# Patient Record
Sex: FEMALE | Employment: FULL TIME | URBAN - METROPOLITAN AREA
[De-identification: names, ages, dates, MRNs, and addresses within clinical notes are randomized per-mention and may not be internally consistent; named-entity substitution may affect disease eponyms.]

---

## 2018-01-22 PROCEDURE — 86780 TREPONEMA PALLIDUM: CPT | Performed by: OBSTETRICS & GYNECOLOGY

## 2018-01-22 PROCEDURE — 87340 HEPATITIS B SURFACE AG IA: CPT | Performed by: OBSTETRICS & GYNECOLOGY

## 2018-01-22 PROCEDURE — 86696 HERPES SIMPLEX TYPE 2 TEST: CPT | Performed by: OBSTETRICS & GYNECOLOGY

## 2018-01-22 PROCEDURE — 87389 HIV-1 AG W/HIV-1&-2 AB AG IA: CPT | Performed by: OBSTETRICS & GYNECOLOGY

## 2018-01-22 PROCEDURE — 88175 CYTOPATH C/V AUTO FLUID REDO: CPT | Performed by: OBSTETRICS & GYNECOLOGY

## 2018-01-22 PROCEDURE — 86803 HEPATITIS C AB TEST: CPT | Performed by: OBSTETRICS & GYNECOLOGY

## 2018-01-22 PROCEDURE — 36415 COLL VENOUS BLD VENIPUNCTURE: CPT | Performed by: OBSTETRICS & GYNECOLOGY

## 2018-01-22 PROCEDURE — 87591 N.GONORRHOEAE DNA AMP PROB: CPT | Performed by: OBSTETRICS & GYNECOLOGY

## 2018-01-22 PROCEDURE — 86695 HERPES SIMPLEX TYPE 1 TEST: CPT | Performed by: OBSTETRICS & GYNECOLOGY

## 2018-01-22 PROCEDURE — 87491 CHLMYD TRACH DNA AMP PROBE: CPT | Performed by: OBSTETRICS & GYNECOLOGY

## 2021-08-06 PROBLEM — J01.00 ACUTE NON-RECURRENT MAXILLARY SINUSITIS: Status: RESOLVED | Noted: 2021-08-06 | Resolved: 2021-08-06

## 2021-08-06 PROBLEM — J01.00 ACUTE NON-RECURRENT MAXILLARY SINUSITIS: Status: ACTIVE | Noted: 2021-08-06

## 2023-09-25 ENCOUNTER — OFFICE VISIT (OUTPATIENT)
Dept: FAMILY MEDICINE CLINIC | Facility: CLINIC | Age: 28
End: 2023-09-25
Payer: COMMERCIAL

## 2023-09-25 VITALS
RESPIRATION RATE: 18 BRPM | DIASTOLIC BLOOD PRESSURE: 60 MMHG | OXYGEN SATURATION: 97 % | HEIGHT: 64 IN | SYSTOLIC BLOOD PRESSURE: 110 MMHG | BODY MASS INDEX: 29.16 KG/M2 | TEMPERATURE: 98 F | WEIGHT: 170.81 LBS | HEART RATE: 75 BPM

## 2023-09-25 DIAGNOSIS — Z00.00 ADULT GENERAL MEDICAL EXAMINATION: Primary | ICD-10-CM

## 2023-09-25 DIAGNOSIS — F41.1 GENERALIZED ANXIETY DISORDER: ICD-10-CM

## 2023-09-25 DIAGNOSIS — K21.9 GASTROESOPHAGEAL REFLUX DISEASE, UNSPECIFIED WHETHER ESOPHAGITIS PRESENT: ICD-10-CM

## 2023-09-25 DIAGNOSIS — F90.9 ATTENTION DEFICIT HYPERACTIVITY DISORDER (ADHD), UNSPECIFIED ADHD TYPE: ICD-10-CM

## 2023-09-25 RX ORDER — LISDEXAMFETAMINE DIMESYLATE 10 MG/1
CAPSULE ORAL
COMMUNITY

## 2023-09-25 RX ORDER — SERTRALINE HYDROCHLORIDE 25 MG/1
TABLET, FILM COATED ORAL
COMMUNITY
Start: 2023-03-30

## 2023-09-25 RX ORDER — EPINEPHRINE 0.3 MG/.3ML
INJECTION SUBCUTANEOUS
COMMUNITY
Start: 2022-12-16

## 2023-09-25 RX ORDER — PANTOPRAZOLE SODIUM 40 MG/1
40 TABLET, DELAYED RELEASE ORAL
Qty: 90 TABLET | Refills: 0 | Status: SHIPPED | OUTPATIENT
Start: 2023-09-25

## 2023-10-03 ENCOUNTER — TELEPHONE (OUTPATIENT)
Facility: CLINIC | Age: 28
End: 2023-10-03

## 2023-10-03 ENCOUNTER — OFFICE VISIT (OUTPATIENT)
Facility: CLINIC | Age: 28
End: 2023-10-03

## 2023-10-03 VITALS
HEIGHT: 64 IN | SYSTOLIC BLOOD PRESSURE: 122 MMHG | DIASTOLIC BLOOD PRESSURE: 79 MMHG | HEART RATE: 69 BPM | BODY MASS INDEX: 28.51 KG/M2 | WEIGHT: 167 LBS

## 2023-10-03 DIAGNOSIS — R13.12 OROPHARYNGEAL DYSPHAGIA: ICD-10-CM

## 2023-10-03 DIAGNOSIS — R49.9 HOARSENESS OR CHANGING VOICE: ICD-10-CM

## 2023-10-03 DIAGNOSIS — K21.9 GASTROESOPHAGEAL REFLUX DISEASE, UNSPECIFIED WHETHER ESOPHAGITIS PRESENT: Primary | ICD-10-CM

## 2023-10-03 DIAGNOSIS — R09.89 THROAT CLEARING: ICD-10-CM

## 2023-10-03 DIAGNOSIS — R13.10 DYSPHAGIA, UNSPECIFIED TYPE: ICD-10-CM

## 2023-10-03 PROCEDURE — 3078F DIAST BP <80 MM HG: CPT

## 2023-10-03 PROCEDURE — 99244 OFF/OP CNSLTJ NEW/EST MOD 40: CPT

## 2023-10-03 PROCEDURE — 3074F SYST BP LT 130 MM HG: CPT

## 2023-10-03 PROCEDURE — 3008F BODY MASS INDEX DOCD: CPT

## 2023-10-03 NOTE — H&P
[address] [facility] - Gastroenterology                                                                                                               Reason for consult: GERD    Requesting physician or provider: Nelda Clifton MD    Patient presents with:  Gastro-esophageal Reflux  Heartburn      HPI:   Jacques Kirk is a 33year old year-old female with medical history including ADHD, OCD, seasonal allergies, GERD. Surgical hx of tonsillectomy. she is here today for evaluation of GERD  -reports since 2017 feeling heartburn. pt was prescribed pantoprazole by PCP and told to follow up with GI. Pt moved to [place] for a few years for work and was scheduled for EGD for May of 2020 which was then canceled because of covid  -pt then moved back to [facility] and never had EGD done  -pt has taken omeprazole over the counter but still gets breakthrough symptoms  -her current symptoms  ---having heartburn daily if not taking omeprazole, triggers include coffee, alcohol  ---occasionally associated with nausea, frequent throat clearing and epigastric pain  ---about 1 month ago patient reports when drinking water was having oral-pharyngeal dysphagia and coughing when drinking water. Associated with voice change during this time which lasted a couple weeks  ----pt saw PCP and started on pantoprazole on 9/25/23  ---she does take daily zyrtec & nasocort spray for seasonal allergies    Patient denies symptoms of  vomiting, odynophagia, globus sensation, hematemesis, abdominal pain, change in bowel habits, constipation, diarrhea, hematochezia, or melena. she denies recent change in appetite, fever or unintentional weight loss.       Last colonoscopy: none   Last EGD:none     NSAIDS: occasional    Tobacco:none   Alcohol: 10 drinks week  Marijuana: rare  Illicit drugs: none     FH GI malignancy: none     No history of adverse reaction to sedation  No TRE  No anticoagulants  No pacemaker/defibrillator  No pain medications and/or sleep aides    Wt Readings from Last 6 Encounters:  10/03/23 : 167 lb (75.8 kg)  09/25/23 : 170 lb 12.8 oz (77.5 kg)  08/06/21 : 154 lb (69.9 kg)  08/06/21 : 154 lb (69.9 kg)  01/22/18 : 139 lb 11.2 oz (63.4 kg)  08/20/15 : 145 lb 12.8 oz (66.1 kg)       History, Medications, Allergies, ROS:      Past Medical History:   Diagnosis Date    Acid reflux 2017    Acne     ADHD     Esophageal reflux     OCD (obsessive compulsive disorder)     Seasonal allergies       Past Surgical History:   Procedure Laterality Date    TONSILLECTOMY  12/2017      Family Hx:   Family History   Problem Relation Age of Onset    Thyroid disease Mother     Hypertension Mother     Heart Disease Maternal Grandfather       Social History:   Social History     Socioeconomic History    Marital status: Single   Tobacco Use    Smoking status: Never    Smokeless tobacco: Never   Vaping Use    Vaping Use: Never used   Substance and Sexual Activity    Alcohol use: Yes     Alcohol/week: 0.0 standard drinks of alcohol     Comment: 2x weekly    Drug use: No    Sexual activity: Yes     Partners: Male     Birth control/protection: Condom, OCP     Comment: 1/22/18        Medications (Active prior to today's visit):  Current Outpatient Medications   Medication Sig Dispense Refill    EPINEPHrine 0.3 MG/0.3ML Injection Solution Auto-injector       sertraline 25 MG Oral Tab TAKE 1 TABLET BY MOUTH EVERY DAY FOR 1-2 WEEKS, THEN 2 TABLETS BY MOUTH DAILY      sertraline 50 MG Oral Tab Take 1.5 tablets (75 mg total) by mouth daily. Lisdexamfetamine Dimesylate (VYVANSE) 10 MG Oral Cap Take by mouth.      pantoprazole 40 MG Oral Tab EC Take 1 tablet (40 mg total) by mouth every morning before breakfast. 90 tablet 0       Allergies:    Shellfish               HIVES    Comment:Pt has since seen allergist and denies this             allergy now.     ROS:   CONSTITUTIONAL: negative for fevers, chills, sweats and weight loss  EYES Negative for scleral icterus or redness, and diplopia  HEENT: Negative for dysphagia and hoarseness  RESPIRATORY: Negative for cough and severe shortness of breath  CARDIOVASCULAR: Negative for crushing sub-sternal chest pain  GASTROINTESTINAL: See HPI  GENITOURINARY: Negative for dysuria  MUSCULOSKELETAL: Negative for arthralgias and myalgias  SKIN: Negative for jaundice, rash or pruritus  NEUROLOGICAL: Negative for dizziness and headaches  BEHAVIOR/PSYCH: Negative for psychotic behavior and poor appetite    PHYSICAL EXAM:   Blood pressure 122/79, pulse 69, height 5' 4\" (1.626 m), weight 167 lb (75.8 kg), last menstrual period 09/11/2023, not currently breastfeeding. GEN: Alert, no acute distress, well-nourished   HEENT: anicteric sclera, neck supple, trachea midline, MMM,   CV: RRR, the extremities are warm and well perfused   LUNGS: No increased work of breathing  ABDOMEN: Soft, symmetrical, non-tender without distention or guarding. No masses, hepatomegaly or splenomegaly noted. MSK: No erythema, no warmth, no swelling of joints  SKIN: No jaundice, no erythema, no rashes, no lesions  HEMATOLOGIC: No bleeding, no bruising  NEURO: Alert and interactive, [place]  PSYCH: appropriate mood & affect    Labs/Imaging/Procedures:     Patient's pertinent labs and imaging were reviewed and discussed with patient today. .  ASSESSMENT/PLAN:   Ursula Gómez is a 33year old year-old female with medical history including ADHD, OCD, seasonal allergies, GERD. Surgical hx of tonsillectomy. she is here today for evaluation of GERD  -reports since 2017 feeling heartburn. pt was prescribed pantoprazole by PCP and told to follow up with GI.  Pt moved to [place] for a few years for work and was scheduled for EGD for May of 2020 which was then canceled because of covid  -pt then moved back to [facility] and never had EGD done  -pt has taken omeprazole over the counter but still gets breakthrough symptoms  -her current symptoms  ---having heartburn daily if not taking omeprazole, triggers include coffee, alcohol  ---occasionally associated with nausea, frequent throat clearing and epigastric pain  ---about 1 month ago patient reports when drinking water was having oral-pharyngeal dysphagia and coughing when drinking water. Associated with voice change during this time which lasted a couple weeks  ----pt saw PCP and started on pantoprazole on 9/25/23  ---she does take daily zyrtec & nasocort spray for seasonal allergies  -Differentials include but not limited to: GERD, dietary pattern induced (large meals, spicy, acidic, fatty, greasy foods, caffeine/alcohol intake), infectious esophagitis, EOE, reflux hypersensitivity or functional heartburn, ess likely esophageal motility disorder,     Recommendations:  -will defer H. Pylori for now given PPI use, will test for this during EGD    -call to schedule xray video swallow    -can take famotidine 20-40mg nightly or as needed nightly    -continue to take pantoprazole     -decrease alcohol intake     -follow up appt with me in January 1. Schedule upper endoscopy (EGD) with Dr. Latricia Lipscomb, Dr. Radha Anderson or Dr. Lakshmi Pabon: chronic GERD w/ voice change & dysphagia]        Orders This Visit:  No orders of the defined types were placed in this encounter. Meds This Visit:  Requested Prescriptions      No prescriptions requested or ordered in this encounter       Imaging & Referrals:  XR VIDEO SWALLOW ([id number]=[id number])      Otis Manjarrez [facility]  10/2/2023        This note was partially prepared using [facility] voice recognition dictation software. As a result, errors may occur. When identified, these errors have been corrected.  While every attempt is made to correct errors during dictation, discrepancies may still exist.

## 2023-10-03 NOTE — TELEPHONE ENCOUNTER
Scheduled for:  [phone]  Provider Name:    Date:  10/10/23  Location:  [facility]  Sedation:  MAC  Time:  9:30 am, (pt is aware to arrive at 8:30 am)  Prep:  NPO after midnight  Meds/Allergies Reconciled?: Rosa Maria/BESSY reviewed. Diagnosis with codes:  GERD K21.9, Dysphagia R13.10  Was patient informed to call insurance with codes (Y/N): Yes    Referral sent?:  Referral was sent at the time of electronic surgical scheduling. [facility] or [address]  notified?: I sent an electronic request to Endo Scheduling and received a confirmation today. Medication Orders:  Hold Vyvanse 7 days prior to procedure. Misc Orders:  N/A     Further instructions given by staff: I discussed the prep instructions with the patient which she verbally understood. Provided patient with prep instructions at the time of office visit.

## 2023-10-03 NOTE — PATIENT INSTRUCTIONS
-will defer H. Pylori for now given PPI use, will test for this during EGD    -call to schedule xray video swallow    -can take famotidine 20-40mg nightly or as needed nightly    -continue to take pantoprazole     -decrease alcohol intake     -follow up appt with me in January 1. Schedule upper endoscopy (EGD) with Dr. Denver Harley, Dr. Laura Garcia or Dr. Rosales Proud: chronic GERD w/ voice change & dysphagia]    2. If you start any NEW medication after your visit today, please notify us. Certain medications will need to be held before the procedure, or the procedure cannot be performed safely. 3. DO NOT TAKE: Iron (ferrous sulfate/ ferrous gluconate) pills, herbal supplements, multivitamins, or diet medications (i.e. Phentermine/Vyvanse) for 7 days before exam.  DO NOT TAKE: Any form of alcohol, recreational drugs and any forms of Erectile Dysfunction medications 24 hours prior to procedure. 4. The day BEFORE your procedure, NOTHING TO EAT OR DRINK AFTER MIDNIGHT! If your procedure is scheduled in the afternoon, you may have clear liquids only (see below) up to 3 hours before the time of your procedure. If you fail to keep your stomach empty for 3 hours prior to procedure time, your procedure may be CANCELLED.  Instructions can also be found at: [email or web address]

## 2023-10-04 RX ORDER — CETIRIZINE HYDROCHLORIDE 10 MG/1
10 TABLET ORAL DAILY
COMMUNITY

## 2023-10-10 ENCOUNTER — ANESTHESIA EVENT (OUTPATIENT)
Dept: ENDOSCOPY | Facility: HOSPITAL | Age: 28
End: 2023-10-10
Payer: COMMERCIAL

## 2023-10-10 ENCOUNTER — ANESTHESIA (OUTPATIENT)
Dept: ENDOSCOPY | Facility: HOSPITAL | Age: 28
End: 2023-10-10
Payer: COMMERCIAL

## 2023-10-10 ENCOUNTER — HOSPITAL ENCOUNTER (OUTPATIENT)
Facility: HOSPITAL | Age: 28
Setting detail: HOSPITAL OUTPATIENT SURGERY
Discharge: HOME | End: 2023-10-10
Attending: INTERNAL MEDICINE | Admitting: INTERNAL MEDICINE
Payer: COMMERCIAL

## 2023-10-10 VITALS
SYSTOLIC BLOOD PRESSURE: 99 MMHG | HEIGHT: 64 IN | RESPIRATION RATE: 25 BRPM | OXYGEN SATURATION: 100 % | WEIGHT: 167 LBS | BODY MASS INDEX: 28.51 KG/M2 | DIASTOLIC BLOOD PRESSURE: 72 MMHG | HEART RATE: 66 BPM

## 2023-10-10 DIAGNOSIS — R13.10 DYSPHAGIA, UNSPECIFIED TYPE: ICD-10-CM

## 2023-10-10 DIAGNOSIS — K21.9 GASTROESOPHAGEAL REFLUX DISEASE, UNSPECIFIED WHETHER ESOPHAGITIS PRESENT: ICD-10-CM

## 2023-10-10 LAB — B-HCG UR QL: NEGATIVE

## 2023-10-10 PROCEDURE — 43239 EGD BIOPSY SINGLE/MULTIPLE: CPT | Performed by: INTERNAL MEDICINE

## 2023-10-10 PROCEDURE — 0DB18ZX EXCISION OF UPPER ESOPHAGUS, VIA NATURAL OR ARTIFICIAL OPENING ENDOSCOPIC, DIAGNOSTIC: ICD-10-PCS | Performed by: INTERNAL MEDICINE

## 2023-10-10 PROCEDURE — 0DB38ZX EXCISION OF LOWER ESOPHAGUS, VIA NATURAL OR ARTIFICIAL OPENING ENDOSCOPIC, DIAGNOSTIC: ICD-10-PCS | Performed by: INTERNAL MEDICINE

## 2023-10-10 PROCEDURE — 0DB78ZX EXCISION OF STOMACH, PYLORUS, VIA NATURAL OR ARTIFICIAL OPENING ENDOSCOPIC, DIAGNOSTIC: ICD-10-PCS | Performed by: INTERNAL MEDICINE

## 2023-10-10 RX ORDER — LIDOCAINE HYDROCHLORIDE 10 MG/ML
INJECTION, SOLUTION EPIDURAL; INFILTRATION; INTRACAUDAL; PERINEURAL AS NEEDED
Status: DISCONTINUED | OUTPATIENT
Start: 2023-10-10 | End: 2023-10-10 | Stop reason: SURG

## 2023-10-10 RX ORDER — SODIUM CHLORIDE, SODIUM LACTATE, POTASSIUM CHLORIDE, CALCIUM CHLORIDE 600; 310; 30; 20 MG/100ML; MG/100ML; MG/100ML; MG/100ML
INJECTION, SOLUTION INTRAVENOUS CONTINUOUS
Status: DISCONTINUED | OUTPATIENT
Start: 2023-10-10 | End: 2023-10-10

## 2023-10-10 RX ADMIN — SODIUM CHLORIDE, SODIUM LACTATE, POTASSIUM CHLORIDE, CALCIUM CHLORIDE: 600; 310; 30; 20 INJECTION, SOLUTION INTRAVENOUS at 09:36:00

## 2023-10-10 RX ADMIN — SODIUM CHLORIDE, SODIUM LACTATE, POTASSIUM CHLORIDE, CALCIUM CHLORIDE: 600; 310; 30; 20 INJECTION, SOLUTION INTRAVENOUS at 09:53:00

## 2023-10-10 RX ADMIN — LIDOCAINE HYDROCHLORIDE 20 MG: 10 INJECTION, SOLUTION EPIDURAL; INFILTRATION; INTRACAUDAL; PERINEURAL at 09:42:00

## 2023-10-10 NOTE — DISCHARGE INSTRUCTIONS
Home Care Instructions for  Gastroscopy with Sedation    Diet:  - Resume your regular diet as tolerated unless otherwise instructed. - Start with light meals to minimize bloating.  - Do not drink alcohol today. Medication:  - If you have questions about resuming your normal medications, please contact your Primary Care Physician. Activities:  - Take it easy today. Do not return to work today. - Do not drive today. - Do not operate any machinery today (including kitchen equipment). Gastroscopy:  - You may have a sore throat for 2-3 days following the exam. This is normal. Gargling with warm salt water (1/2 tsp salt to 1 glass warm water) or using throat lozenges will help. - If you experience any sharp pain in your neck, abdomen or chest, vomiting of blood, oral temperature over 100 degrees Fahrenheit, light-headedness or dizziness, or any other problems, contact your doctor. **If unable to reach your doctor, please go to the [facility] Emergency Room**    - Your referring physician will receive a full report of your examination.  - If you do not hear from your doctor's office within two weeks of your biopsy, please call them for your results. You may be able to see your laboratory results in [facility] between 4 and 7 business days. In some cases, your physician may not have viewed the results before they are released to [address] If you have questions regarding your results contact the physician who ordered the test/exam by phone or via [address] by choosing \"Ask a Medical Question. \"

## 2023-10-10 NOTE — ANESTHESIA POSTPROCEDURE EVALUATION
Patient: Esteban Schaumann [place]    Procedure Summary       Date: 10/10/23 Room / Location: [address] [place] [address] ENDOSCOPY 04 / [facility] ENDOSCOPY    Anesthesia Start: 9950 Anesthesia Stop:     Procedure: ESOPHAGOGASTRODUODENOSCOPY Diagnosis:       Gastroesophageal reflux disease, unspecified whether esophagitis present      Dysphagia, unspecified type      (gastric polyps, hiatal hernia)    Surgeons: Michael Jo MD Anesthesiologist: Peña Gao CRNA    Anesthesia Type: MAC ASA Status: 2            Anesthesia Type: MAC    Vitals Value Taken Time   BP 97/65 10/10/23 0954   Temp 97 10/10/23 0954   Pulse 71 10/10/23 0954   Resp 16 10/10/23 0954   SpO2 96 10/10/23 0954       [facility] AN Post Evaluation:   Patient Evaluated in PACU  Patient Participation: waiting for patient participation  Level of Consciousness: sleepy but conscious  Pain Score: 0  Pain Management: adequate  Airway Patency:patent  Dental exam unchanged from preop  Yes    Cardiovascular Status: acceptable  Respiratory Status: acceptable  Postoperative Hydration acceptable      Pamella Barry CRNA  10/10/2023 9:54 AM

## 2023-10-10 NOTE — OPERATIVE REPORT
[address] Endoscopy Report      Date of Procedure:  10/10/23        Preoperative Diagnosis:  Gastroesophageal reflux      Postoperative Diagnosis:  1. Small hiatal hernia  2. Diminutive gastric polyps      Procedure:    Esophagogastroduodenoscopy with biopsy      Surgeon:  Gilda Wilson M.D. Anesthesia:  Monitored anesthesia care  EBL:  Insignificant      Brief History: This is a 33year old female who presents for an upper endoscopy to evaluate a several year history of gastroesophageal reflux (heartburn) previously incompletely responsive to acid suppression. Symptoms are now better controlled on 40 mg of pantoprazole daily. The patient had 1 episode of oropharyngeal dysphagia to liquids but has had no solid food dysphagia. She has occasional upper abdominal discomfort. She is currently asymptomatic on pantoprazole. Technique:  After informed consent, the patient was placed in the left lateral recumbent position. An Olympus adult HD gastroscope was inserted into the hypopharynx and advanced under direct vision into the esophagus, stomach and duodenum. The endoscope was withdrawn and a retroflexed view of the gastric angulus, body, cardia and fundus was performed. The instrument was straightened insufflated air and fluid were suctioned and the endoscope was withdrawn. The procedure was well tolerated without immediate complication. Findings: The esophagus was normal without evidence of ulceration, erosion, stricture or ring. There were no features of eosinophilic esophagitis. Biopsies were obtained from the proximal and distal esophagus and placed in separate containers. The squamocolumnar junction was slightly irregular. Biopsies of the junction were included in the aforementioned distal esophageal biopsies. The GE junction and diaphragmatic impression were at 35/36 cm with a 1 cm sliding hiatal hernia.   The stomach distended appropriately with insufflated air.  The mucosa of the stomach including cardia, fundus, gastric body and antrum was normal with the exception of several 3-4 mm pale opaque cloudlike polyps in the proximal gastric body and fundus consistent with fundic gland polyps. A few were biopsied. Biopsies were taken from the antrum and placed in a separate container. The duodenal bulb and post bulbar region were normal.      Impression:  1. Small hiatal hernia  2. Diminutive gastric polyps likely fundic gland in nature    Recommendations:  1. Follow-up biopsy results. 2.  Continue dietary and lifestyle modification for reflux along with acid suppression as needed. 3.  Further recommendations pending biopsy and clinical course.       Elaine Tenorio MD  10/10/2023

## 2023-10-10 NOTE — H&P
History & Physical Examination    Patient Name: Harry Ferrer  MRN: [id number]  CSN: [id number]  YOB: 1995    Diagnosis: Gastroesophageal reflux      cetirizine 10 MG Oral Tab, Take 1 tablet (10 mg total) by mouth daily. , Disp: , Rfl:   EPINEPHrine 0.3 MG/0.3ML Injection Solution Auto-injector, , Disp: , Rfl:   sertraline 25 MG Oral Tab, TAKE 1 TABLET BY MOUTH EVERY DAY FOR 1-2 WEEKS, THEN 2 TABLETS BY MOUTH DAILY, Disp: , Rfl:   sertraline 50 MG Oral Tab, Take 1.5 tablets (75 mg total) by mouth daily. , Disp: , Rfl:   Lisdexamfetamine Dimesylate (VYVANSE) 10 MG Oral Cap, Take by mouth., Disp: , Rfl: , 9/29/2023  pantoprazole 40 MG Oral Tab EC, Take 1 tablet (40 mg total) by mouth every morning before breakfast., Disp: 90 tablet, Rfl: 0      lactated ringers infusion, , Intravenous, Continuous        Allergies:   Shellfish               HIVES    Comment:Pt has since seen allergist and denies this             allergy now. Past Medical History:   Diagnosis Date    Acid reflux 2017    Acne     ADHD     Attention deficit disorder     Depression     Esophageal reflux     OCD (obsessive compulsive disorder)     Seasonal allergies      Past Surgical History:   Procedure Laterality Date    TONSILLECTOMY  12/2017     Family History   Problem Relation Age of Onset    Thyroid disease Mother     Hypertension Mother     Heart Disease Maternal Grandfather      Social History    Tobacco Use      Smoking status: Never      Smokeless tobacco: Never    Alcohol use: Yes      Alcohol/week: 0.0 standard drinks of alcohol      Comment: 2x weekly      SYSTEM Check if Review is Normal Check if Physical Exam is Normal If not normal, please explain:   BRADEN Murphy ] [ Juan Jose Murphy ] [ X]    HEART Erich.Trey ] [ Gabriela Jung.Hoar ] [ Aurelio Jung.Hoar ] [ Krishna Jung.Trey ] [ Vitaliy Nix        [ x ] I have discussed the risks and benefits and alternatives with the patient/family.   They understand and agree to proceed with plan of care. [ x ] I have reviewed the History and Physical done within the last 30 days. Any changes noted above.     Yovany Jaime MD  10/10/2023  9:55 AM

## 2023-12-28 DIAGNOSIS — K21.9 GASTROESOPHAGEAL REFLUX DISEASE, UNSPECIFIED WHETHER ESOPHAGITIS PRESENT: ICD-10-CM

## 2023-12-28 RX ORDER — PANTOPRAZOLE SODIUM 40 MG/1
40 TABLET, DELAYED RELEASE ORAL
Qty: 90 TABLET | Refills: 0 | Status: SHIPPED | OUTPATIENT
Start: 2023-12-28

## 2024-03-19 DIAGNOSIS — K21.9 GASTROESOPHAGEAL REFLUX DISEASE, UNSPECIFIED WHETHER ESOPHAGITIS PRESENT: ICD-10-CM

## 2024-03-19 RX ORDER — PANTOPRAZOLE SODIUM 40 MG/1
40 TABLET, DELAYED RELEASE ORAL
Qty: 90 TABLET | Refills: 0 | Status: SHIPPED | OUTPATIENT
Start: 2024-03-19

## 2024-06-27 DIAGNOSIS — K21.9 GASTROESOPHAGEAL REFLUX DISEASE, UNSPECIFIED WHETHER ESOPHAGITIS PRESENT: ICD-10-CM

## 2024-06-27 RX ORDER — PANTOPRAZOLE SODIUM 40 MG/1
40 TABLET, DELAYED RELEASE ORAL
Qty: 90 TABLET | Refills: 0 | Status: SHIPPED | OUTPATIENT
Start: 2024-06-27

## 2024-06-27 NOTE — TELEPHONE ENCOUNTER
A refill request was received for:  Requested Prescriptions     Pending Prescriptions Disp Refills    PANTOPRAZOLE 40 MG Oral Tab EC [Pharmacy Med Name: PANTOPRAZOLE SOD DR 40 MG TAB] 90 tablet 0     Sig: TAKE 1 TABLET BY MOUTH BEFORE BREAKFAST     Last refill date:  03/19/2024  Qty: 90  Dx:   Gastroesophageal reflux disease, unspecified whether esophagitis        Last office visit: 09/25/2023   When is follow up due: 1 year       No future appointments.

## 2024-09-29 DIAGNOSIS — K21.9 GASTROESOPHAGEAL REFLUX DISEASE, UNSPECIFIED WHETHER ESOPHAGITIS PRESENT: ICD-10-CM

## 2024-09-30 NOTE — TELEPHONE ENCOUNTER
A refill request was received for:  Requested Prescriptions     Pending Prescriptions Disp Refills    PANTOPRAZOLE 40 MG Oral Tab EC [Pharmacy Med Name: PANTOPRAZOLE SOD DR 40 MG TAB] 90 tablet 0     Sig: TAKE 1 TABLET BY MOUTH BEFORE BREAKFAST     Last refill date:  6/27/24  Qty: 90 and 0   Dx: GERD  Last office visit: 9/25/23   When is follow up due: now     For hormone prescriptions, date of last blood test for rx being requested:    No future appointments.

## 2024-10-02 DIAGNOSIS — K21.9 GASTROESOPHAGEAL REFLUX DISEASE, UNSPECIFIED WHETHER ESOPHAGITIS PRESENT: ICD-10-CM

## 2024-10-02 RX ORDER — PANTOPRAZOLE SODIUM 40 MG/1
40 TABLET, DELAYED RELEASE ORAL
Qty: 90 TABLET | Refills: 0 | OUTPATIENT
Start: 2024-10-02

## 2024-10-03 RX ORDER — PANTOPRAZOLE SODIUM 40 MG/1
40 TABLET, DELAYED RELEASE ORAL
Qty: 90 TABLET | Refills: 0 | Status: SHIPPED | OUTPATIENT
Start: 2024-10-03 | End: 2024-10-07

## 2024-10-07 ENCOUNTER — TELEMEDICINE (OUTPATIENT)
Dept: FAMILY MEDICINE CLINIC | Facility: CLINIC | Age: 29
End: 2024-10-07
Payer: COMMERCIAL

## 2024-10-07 DIAGNOSIS — K21.9 GASTROESOPHAGEAL REFLUX DISEASE, UNSPECIFIED WHETHER ESOPHAGITIS PRESENT: ICD-10-CM

## 2024-10-07 RX ORDER — GARLIC EXTRACT 500 MG
1 CAPSULE ORAL DAILY
COMMUNITY

## 2024-10-07 RX ORDER — PANTOPRAZOLE SODIUM 40 MG/1
40 TABLET, DELAYED RELEASE ORAL
Qty: 90 TABLET | Refills: 2 | Status: SHIPPED | OUTPATIENT
Start: 2024-10-07

## 2024-10-07 RX ORDER — MULTIVITAMIN WITH IRON
250 TABLET ORAL
COMMUNITY

## 2024-10-07 RX ORDER — SERTRALINE HYDROCHLORIDE 100 MG/1
100 TABLET, FILM COATED ORAL DAILY
COMMUNITY
Start: 2024-09-27

## 2024-10-07 RX ORDER — LISDEXAMFETAMINE DIMESYLATE 20 MG/1
20 CAPSULE ORAL DAILY
COMMUNITY
Start: 2024-10-03

## 2024-10-07 NOTE — PROGRESS NOTES
Due to the real risk of possible exposure to Coronavirus (CoV-2, COVID-19) in the office/medical building and recommendations for social distancing (key to mitigation/limiting the spread of the virus) a Virtual or Telemedicine visit over the phone was performed as below.     Patient has consented to the Virtual/Telephone Check-In service and expresses understanding and accepts financial responsibility for any deductible, co-insurance and/or co-pays associated with this service.    [facility]  Telehealth Verbal Consent   I conducted a telehealth visit with Edie Jack today, 10/07/24, which was completed using two-way, real-time interactive audio and video communication. This has been done in good elisa to provide continuity of care in the best interest of the provider-patient relationship, due to the COVID -19 public health crisis/national emergency where restrictions of face-to-face office visits are ongoing. Every conscious effort was taken to allow for sufficient and adequate time to complete the visit.  The patient was made aware of the limitations of the telehealth visit, including treatment limitations as no physical exam could be performed.  The patient was advised to call 911 or to go to the ER in case there was an emergency.  The patient was also advised of the potential privacy & security concerns related to the telehealth platform.   The patient was made aware of where to find [facility]'s notice of privacy practices, telehealth consent form and other related consent forms and documents.  which are located on the [facility] website. The patient verbally agreed to telehealth consent form, related consents and the risks discussed.    Lastly, the patient confirmed that they were in [place].   Included in this visit, time may have been spent reviewing labs, medications, radiology tests and decision making. Appropriate medical decision-making and tests are ordered as detailed in the plan of  care above.  Coding/billing information is submitted for this visit based on complexity of care and/or time spent for the visit.    HPI:  Chief Complaint   Patient presents with    Follow - Up     Medication refills       Edie Jack is a 29 year old female who calls for complaints of:    Refill of pantoprazole. Hx GERD that is controlled with PPI, cannot tolerate being off this medicine. Had an EGD in 2023 which showed mild chronic gastritis. She has also been practicing lifestyle modifications. Feeling well overall.     ROS:  GEN: Denies fever, chills, fatigue.  CARDIOVASCULAR: Denies chest pain, dyspnea.  RESPIRATORY: Denies cough, dyspnea.  GI: Denies n/v/d/c, appetite normal.  NEURO: Denies headaches, dizziness.    Physical Exam:  GEN:  Patient is alert, awake and oriented, well developed, well nourished.  LUNGS: Patient speaks clearly in full sentences without dyspnea, no cough while on video visit.  PSYCH: Appropriate mood and affect.    Allergies   Allergen Reactions    Shellfish HIVES     Pt has since seen allergist and denies this allergy now.      Current Outpatient Medications   Medication Sig Dispense Refill    VYVANSE 20 MG Oral Cap Take 1 capsule (20 mg total) by mouth daily.      sertraline 100 MG Oral Tab Take 1 tablet (100 mg total) by mouth daily.      acidophilus-pectin Oral Cap Take 1 capsule by mouth daily.      B Complex-C-Folic Acid Oral Tab Take by mouth.      magnesium 250 MG Oral Tab Take 1 tablet (250 mg total) by mouth.      pantoprazole 40 MG Oral Tab EC Take 1 tablet (40 mg total) by mouth before breakfast. 90 tablet 2    cetirizine 10 MG Oral Tab Take 1 tablet (10 mg total) by mouth daily.      EPINEPHrine 0.3 MG/0.3ML Injection Solution Auto-injector        Past Medical History:    Acid reflux    Acne    ADHD    Attention deficit disorder    Depression    Esophageal reflux    OCD (obsessive compulsive disorder)    Seasonal allergies     Past Surgical History:   Procedure  Laterality Date    Tonsillectomy  12/2017         ASSESSMENT AND PLAN:   1. Patient is a 29 year old female who calls for: GERD follow-up    Additional Assessment and Plan:  1. Gastroesophageal reflux disease, unspecified whether esophagitis present  -Stable symptoms, CPM. Discussed risks vs benefits of long-term PPIs. Follow-up with GI as directed.   - pantoprazole 40 MG Oral Tab EC; Take 1 tablet (40 mg total) by mouth before breakfast.  Dispense: 90 tablet; Refill: 2        Follow up with me 3-6 months for physical    Call and/or go to the ER if worsening symptoms including, but not limited to: respiratory distress, shortness of breath and  wheezing, worsening fever, cough and mental status.      The patient (or patient's parent if <17 y/o) indicates understanding of the above recommendations and agrees to the above plan.    No orders of the defined types were placed in this encounter.      Meds & Refills for this Visit:  Requested Prescriptions     Signed Prescriptions Disp Refills    pantoprazole 40 MG Oral Tab EC 90 tablet 2     Sig: Take 1 tablet (40 mg total) by mouth before breakfast.       Imaging & Consults:  None    BESSY Waters     Time spent during encounter: 15 minutes

## 2025-02-27 ENCOUNTER — OFFICE VISIT (OUTPATIENT)
Facility: CLINIC | Age: 30
End: 2025-02-27

## 2025-02-27 VITALS
SYSTOLIC BLOOD PRESSURE: 124 MMHG | BODY MASS INDEX: 27.66 KG/M2 | HEART RATE: 80 BPM | HEIGHT: 64 IN | DIASTOLIC BLOOD PRESSURE: 78 MMHG | WEIGHT: 162 LBS

## 2025-02-27 DIAGNOSIS — K21.00 GASTROESOPHAGEAL REFLUX DISEASE WITH ESOPHAGITIS WITHOUT HEMORRHAGE: Primary | ICD-10-CM

## 2025-02-27 DIAGNOSIS — K44.9 HIATAL HERNIA: ICD-10-CM

## 2025-02-27 PROCEDURE — 3078F DIAST BP <80 MM HG: CPT

## 2025-02-27 PROCEDURE — 3074F SYST BP LT 130 MM HG: CPT

## 2025-02-27 PROCEDURE — 3008F BODY MASS INDEX DOCD: CPT

## 2025-02-27 PROCEDURE — 99214 OFFICE O/P EST MOD 30 MIN: CPT

## 2025-02-27 NOTE — PATIENT INSTRUCTIONS
-wean off pantoprazole    -instead take famotidine (pepcid) 20mg once to twice daily. Start by taking nightly before bed    -work with dietician to identify and avoid triggers. Virtual option \"[place]\"

## 2025-02-27 NOTE — PROGRESS NOTES
[facility] - Gastroenterology                                                                                                               Reason for consult: GERD    Requesting physician or provider: Roxana Billy MD    No chief complaint on file.      HPI:   Edie Jack is a 30 year old year-old female with medical history including ADHD, OCD, seasonal allergies, GERD. Surgical hx of tonsillectomy.     Today:  #GERD  -since prior visit she has been feeling well. She is getting  in July and would like to try to get pregnant later this year. She feels reliant on pantoprazole, heartburn and nausea return if she doesn't take pantoprazole even for 1 day. Her goal is to discontinue pantoprazole.  -she does avoid triggers such as BBQ sauce, alcohol, red wine, pizza, coffee  -EGD in October 2023, finding of small hiatal hernia, fundic gland gastric polyp, mild inflammation at GE junction.    Prior visit 10/3/2023:  she is here today for evaluation of GERD  -reports since 2017 feeling heartburn. pt was prescribed pantoprazole by PCP and told to follow up with GI. Pt moved to [place] for a few years for work and was scheduled for EGD for May of 2020 which was then canceled because of covid  -pt then moved back to [place] and never had EGD done  -pt has taken omeprazole over the counter but still gets breakthrough symptoms  -her current symptoms  ---having heartburn daily if not taking omeprazole, triggers include coffee, alcohol  ---occasionally associated with nausea, frequent throat clearing and epigastric pain  ---about 1 month ago patient reports when drinking water was having oral-pharyngeal dysphagia and coughing when drinking water. Associated with voice change during this time which lasted a couple weeks  ----pt saw PCP and started on pantoprazole on 9/25/23  ---she does take daily zyrtec & nasocort  spray for seasonal allergies    Patient denies symptoms of  vomiting, odynophagia, globus sensation, hematemesis, abdominal pain, change in bowel habits, constipation, diarrhea, hematochezia, or melena. she denies recent change in appetite, fever or unintentional weight loss.      Last colonoscopy: none   Last EGD: 10/10/2023  -small hiatal hernia  -gastric polyps     A. Gastric antrum; biopsy:  Gastric mucosa with mild chronic inactive gastritis.  No dysplasia or metaplasia is identified.  Diff-Quik stain (with appropriate control reactivity) is negative for H. pylori microorganisms.     B. Gastric polyp; biopsy:  Fundic gland polyp.  No dysplasia or metaplasia is identified.  Diff-Quik stain (with appropriate control reactivity) is negative for H. pylori microorganisms.      C. Distal esophagus; biopsy:  Gastroesophageal junction mucosa with mild chronic and focal acute inflammation and reactive changes, consistent with reflux.   No evidence of dysplasia or metaplasia identified.     D. Proximal esophagus; biopsy:    Squamous mucosa with no significant pathologic changes.  No increase in intraepithelial eosinophils is identified.  No dysplasia or metaplasia is identified.    NSAIDS: occasional    Tobacco:none   Alcohol: 10 drinks week  Marijuana: rare  Illicit drugs: none     FH GI malignancy: none     No history of adverse reaction to sedation  No TRE  No anticoagulants  No pacemaker/defibrillator  No pain medications and/or sleep aides    Wt Readings from Last 6 Encounters:   02/27/25 162 lb (73.5 kg)   10/04/23 167 lb (75.8 kg)   10/03/23 167 lb (75.8 kg)   09/25/23 170 lb 12.8 oz (77.5 kg)   08/06/21 154 lb (69.9 kg)   08/06/21 154 lb (69.9 kg)        History, Medications, Allergies, ROS:      Past Medical History:    Acid reflux    Acne    ADHD    Attention deficit disorder    Depression    Esophageal reflux    OCD (obsessive compulsive disorder)    Seasonal allergies      Past Surgical History:   Procedure  Laterality Date    Tonsillectomy  12/2017      Family Hx:   Family History   Problem Relation Age of Onset    Thyroid disease Mother     Hypertension Mother     Heart Disease Maternal Grandfather     Colon Cancer Neg       Social History:   Social History     Socioeconomic History    Marital status: Single   Tobacco Use    Smoking status: Never    Smokeless tobacco: Never   Vaping Use    Vaping status: Never Used   Substance and Sexual Activity    Alcohol use: Yes     Alcohol/week: 0.0 standard drinks of alcohol     Comment: 2x weekly    Drug use: No    Sexual activity: Yes     Partners: Male     Birth control/protection: Condom, OCP     Comment: 1/22/18     Social Drivers of Health      [facility]    Housing Stability        Medications (Active prior to today's visit):  Current Outpatient Medications   Medication Sig Dispense Refill    VYVANSE 20 MG Oral Cap Take 1 capsule (20 mg total) by mouth daily.      sertraline 100 MG Oral Tab Take 1 tablet (100 mg total) by mouth daily.      acidophilus-pectin Oral Cap Take 1 capsule by mouth daily.      B Complex-C-Folic Acid Oral Tab Take by mouth.      magnesium 250 MG Oral Tab Take 1 tablet (250 mg total) by mouth.      pantoprazole 40 MG Oral Tab EC Take 1 tablet (40 mg total) by mouth before breakfast. 90 tablet 2    cetirizine 10 MG Oral Tab Take 1 tablet (10 mg total) by mouth daily.      EPINEPHrine 0.3 MG/0.3ML Injection Solution Auto-injector          Allergies:  Allergies   Allergen Reactions    Shellfish HIVES     Pt has since seen allergist and denies this allergy now.        ROS:   CONSTITUTIONAL: negative for fevers, chills, sweats and weight loss  EYES Negative for scleral icterus or redness, and diplopia  HEENT: Negative for dysphagia and hoarseness  RESPIRATORY: Negative for cough and severe shortness of breath  CARDIOVASCULAR: Negative for crushing sub-sternal chest pain  GASTROINTESTINAL:  See HPI  GENITOURINARY: Negative for dysuria  MUSCULOSKELETAL: Negative for arthralgias and myalgias  SKIN: Negative for jaundice, rash or pruritus  NEUROLOGICAL: Negative for dizziness and headaches  BEHAVIOR/PSYCH: Negative for psychotic behavior and poor appetite    PHYSICAL EXAM:   Blood pressure 124/78, pulse 80, height 5' 4\" (1.626 m), weight 162 lb (73.5 kg), not currently breastfeeding.    GEN: Alert, no acute distress, well-nourished   HEENT: anicteric sclera, neck supple, trachea midline, MMM,   CV: RRR, the extremities are warm and well perfused   LUNGS: No increased work of breathing  ABDOMEN: Soft, symmetrical, non-tender without distention or guarding. No masses, hepatomegaly or splenomegaly noted.  MSK: No erythema, no warmth, no swelling of joints  SKIN: No jaundice, no erythema, no rashes, no lesions  HEMATOLOGIC: No bleeding, no bruising  NEURO: Alert and interactive, [place]  PSYCH: appropriate mood & affect    Labs/Imaging/Procedures:     Patient's pertinent labs and imaging were reviewed and discussed with patient today.        .  ASSESSMENT/PLAN:   Edie Jack is a 30 year old year-old female with medical history including ADHD, OCD, seasonal allergies, GERD. Surgical hx of tonsillectomy.     Today:  #GERD  -since prior visit she has been feeling well. She is getting  in July and would like to try to get pregnant later this year. She feels reliant on pantoprazole, heartburn and nausea return if she doesn't take pantoprazole even for 1 day. Her goal is to discontinue pantoprazole.  -she does avoid triggers such as BBQ sauce, alcohol, red wine, pizza, coffee  -EGD in October 2023, finding of small hiatal hernia, fundic gland gastric polyp, mild inflammation at GE junction.    -we discussed diet control of GERD symptoms and establishing care with dietician. Advised to slowly wean of pantoprazole and instead take famotidine. She may wean off famotidine in the future pending symptoms.  We discussed common heartburn during pregnancy and may require famotidine during pregnancy. Follow up as needed    Recommendations:  -wean off pantoprazole    -instead take famotidine (pepcid) 20mg once to twice daily. Start by taking nightly before bed    -work with dietician to identify and avoid triggers. Virtual option \"[place]\"      Orders This Visit:  No orders of the defined types were placed in this encounter.      Meds This Visit:  Requested Prescriptions      No prescriptions requested or ordered in this encounter       Imaging & Referrals:  None      BESSY Bedolla   [facility] Gastroenterology  2/27/2025        This note was partially prepared using Dragon Medical voice recognition dictation software. As a result, errors may occur. When identified, these errors have been corrected. While every attempt is made to correct errors during dictation, discrepancies may still exist.

## 2025-07-28 ENCOUNTER — PATIENT MESSAGE (OUTPATIENT)
Facility: CLINIC | Age: 30
End: 2025-07-28

## (undated) DEVICE — YANKAUER SUCTION INSTRUMENT NO CONTROL VENT, BULB TIP, CLEAR: Brand: YANKAUER

## (undated) DEVICE — Device: Brand: DUAL NARE NASAL CANNULAE FEMALE LUER CON 7FT O2 TUBE

## (undated) DEVICE — MEDI-VAC NON-CONDUCTIVE SUCTION TUBING: Brand: CARDINAL HEALTH

## (undated) DEVICE — MEDI-VAC NON-CONDUCTIVE SUCTION TUBING 6MM X 1.8M (6FT.) L: Brand: CARDINAL HEALTH

## (undated) DEVICE — FORCEPS BX L240CM DIA2.4MM L NDL RAD JAW 4

## (undated) DEVICE — KIT ENDO ORCAPOD 160/180/190

## (undated) DEVICE — CONMED SCOPE SAVER BITE BLOCK, 20X27 MM: Brand: SCOPE SAVER

## (undated) DEVICE — KIT CLEAN ENDOKIT 1.1OZ GOWNX2

## (undated) NOTE — LETTER
[address]  Authorization for Surgical Operation and Procedure                                                                                           I hereby authorize Agus Hunter MD, my physician and his/her assistants (if applicable), which may include medical students, residents, and/or fellows, to perform the following surgical operation/ procedure and administer such anesthesia as may be determined necessary by my physician: Operation/Procedure name (s) ESOPHAGOGASTRODUODENOSCOPY on [facility]   2. I recognize that during the surgical operation/procedure, unforeseen conditions may necessitate additional or different procedures than those listed above. I, therefore, further authorize and request that the above-named surgeon, assistants, or designees perform such procedures as are, in their judgment, necessary and desirable. 3.   My surgeon/physician has discussed prior to my surgery the potential benefits, risks and side effects of this procedure; the likelihood of achieving goals; and potential problems that might occur during recuperation. They also discussed reasonable alternatives to the procedure, including risks, benefits, and side effects related to the alternatives and risks related to not receiving this procedure. I have had all my questions answered and I acknowledge that no guarantee has been made as to the result that may be obtained. 4.   Should the need arise during my operation/procedure, which includes change of level of care prior to discharge, I also consent to the administration of blood and/or blood products. Further, I understand that despite careful testing and screening of blood or blood products by collecting agencies, I may still be subject to ill effects as a result of receiving a blood transfusion and/or blood products.   The following are some, but not all, of the potential risks that can occur: fever and allergic reactions, hemolytic reactions, transmission of diseases such as Hepatitis, AIDS and Cytomegalovirus (CMV) and fluid overload. In the event that I wish to have an autologous transfusion of my own blood, or a directed donor transfusion, I will discuss this with my physician. Check only if Refusing Blood or Blood Products  I understand refusal of blood or blood products as deemed necessary by my physician may have serious consequences to my condition to include possible death. I hereby assume responsibility for my refusal and release the hospital, its personnel, and my physicians from any responsibility for the consequences of my refusal.    o  Refuse   5. I authorize the use of any specimen, organs, tissues, body parts or foreign objects that may be removed from my body during the operation/procedure for diagnosis, research or teaching purposes and their subsequent disposal by hospital authorities. I also authorize the release of specimen test results and/or written reports to my treating physician on the hospital medical staff or other referring or consulting physicians involved in my care, at the discretion of the Pathologist or my treating physician. 6.   I consent to the photographing or videotaping of the operations or procedures to be performed, including appropriate portions of my body for medical, scientific, or educational purposes, provided my identity is not revealed by the pictures or by descriptive texts accompanying them. If the procedure has been photographed/videotaped, the surgeon will obtain the original picture, image, videotape or CD. The hospital will not be responsible for storage, release or maintenance of the picture, image, tape or CD.    7.   I consent to the presence of a  or observers in the operating room as deemed necessary by my physician or their designees.     8.   I recognize that in the event my procedure results in extended X-Ray/fluoroscopy time, I may develop a skin reaction. 9. If I have a Do Not Attempt Resuscitation (DNAR) order in place, that status will be suspended while in the operating room, procedural suite, and during the recovery period unless otherwise explicitly stated by me (or a person authorized to consent on my behalf). The surgeon or my attending physician will determine when the applicable recovery period ends for purposes of reinstating the DNAR order. 10. Patients having a sterilization procedure: I understand that if the procedure is successful the results will be permanent and it will therefore be impossible for me to inseminate, conceive, or bear children. I also understand that the procedure is intended to result in sterility, although the result has not been guaranteed. 11. I acknowledge that my physician has explained sedation/analgesia administration to me including the risk and benefits I consent to the administration of sedation/analgesia as may be necessary or desirable in the judgment of my physician. I CERTIFY THAT I HAVE READ AND FULLY UNDERSTAND THE ABOVE CONSENT TO OPERATION and/or OTHER PROCEDURE.     _________________________________________ _________________________________     ___________________________________  Signature of Patient     Signature of Responsible Person                   Printed Name of Responsible Person                              _________________________________________ ______________________________        ___________________________________  Signature of Witness         Date  Time         Relationship to Patient    STATEMENT OF PHYSICIAN My signature below affirms that prior to the time of the procedure; I have explained to the patient and/or his/her legal representative, the risks and benefits involved in the proposed treatment and any reasonable alternative to the proposed treatment.  I have also explained the risks and benefits involved in refusal of the proposed treatment and alternatives to the proposed treatment and have answered the patient's questions.  If I have a significant financial interest in a co-management agreement or a significant financial interest in any product or implant, or other significant relationship used in this procedure/surgery, I have disclosed this and had a discussion with my patient.     _______________________________________________________________ _____________________________  Arash Orf of Physician)                                                                                         (Date)                                   (Time)  Patient Name: Fabio Coe    : 1/15/1995   Printed: 10/6/2023      Medical Record #: [id number]                                              Page 1 of 1